# Patient Record
Sex: FEMALE | Race: OTHER | Employment: FULL TIME | URBAN - METROPOLITAN AREA
[De-identification: names, ages, dates, MRNs, and addresses within clinical notes are randomized per-mention and may not be internally consistent; named-entity substitution may affect disease eponyms.]

---

## 2017-06-15 ENCOUNTER — HOSPITAL ENCOUNTER (OUTPATIENT)
Age: 32
Discharge: HOME | End: 2017-06-15
Attending: EMERGENCY MEDICINE
Payer: COMMERCIAL

## 2017-06-15 ENCOUNTER — APPOINTMENT (OUTPATIENT)
Dept: GENERAL RADIOLOGY | Age: 32
End: 2017-06-15
Attending: EMERGENCY MEDICINE
Payer: COMMERCIAL

## 2017-06-15 VITALS
SYSTOLIC BLOOD PRESSURE: 108 MMHG | OXYGEN SATURATION: 98 % | DIASTOLIC BLOOD PRESSURE: 87 MMHG | BODY MASS INDEX: 22 KG/M2 | WEIGHT: 115 LBS | TEMPERATURE: 98 F | RESPIRATION RATE: 16 BRPM | HEART RATE: 88 BPM

## 2017-06-15 DIAGNOSIS — S93.402A MODERATE ANKLE SPRAIN, LEFT, INITIAL ENCOUNTER: Primary | ICD-10-CM

## 2017-06-15 PROCEDURE — 73630 X-RAY EXAM OF FOOT: CPT | Performed by: EMERGENCY MEDICINE

## 2017-06-15 PROCEDURE — 73610 X-RAY EXAM OF ANKLE: CPT | Performed by: EMERGENCY MEDICINE

## 2017-06-15 PROCEDURE — 99203 OFFICE O/P NEW LOW 30 MIN: CPT

## 2017-06-15 NOTE — ED PROVIDER NOTES
Patient presents with: Ankle Injury  Lower Extremity Injury (musculoskeletal)    HPI:     Gomez Alexander is a 27year old female who presents with chief complaint of ankle/foot injury.   About 2 weeks ago, pt foot fell asleep, she went to get up and inver Technologist)  Patient states 2 weeks ago she stood up from sitting at a table and rolled her left ankle/foot. Patient has pain to left lateral ankle and foot and top of foot over 2nd-5th metatarsals.       FINDINGS:  No fracture, dislocation or osseous abn

## 2018-07-28 ENCOUNTER — HOSPITAL ENCOUNTER (OUTPATIENT)
Age: 33
Discharge: HOME | End: 2018-07-28
Payer: COMMERCIAL

## 2018-07-28 VITALS
OXYGEN SATURATION: 100 % | HEART RATE: 69 BPM | DIASTOLIC BLOOD PRESSURE: 87 MMHG | RESPIRATION RATE: 16 BRPM | WEIGHT: 110 LBS | TEMPERATURE: 98 F | BODY MASS INDEX: 21 KG/M2 | SYSTOLIC BLOOD PRESSURE: 136 MMHG

## 2018-07-28 DIAGNOSIS — H10.32 ACUTE BACTERIAL CONJUNCTIVITIS OF LEFT EYE: Primary | ICD-10-CM

## 2018-07-28 PROCEDURE — 99214 OFFICE O/P EST MOD 30 MIN: CPT

## 2018-07-28 PROCEDURE — 99213 OFFICE O/P EST LOW 20 MIN: CPT

## 2018-07-28 RX ORDER — CIPROFLOXACIN HYDROCHLORIDE 3.5 MG/ML
1 SOLUTION/ DROPS TOPICAL EVERY 4 HOURS
Qty: 1 BOTTLE | Refills: 0 | Status: SHIPPED | OUTPATIENT
Start: 2018-07-28 | End: 2018-08-04

## 2018-07-28 NOTE — ED PROVIDER NOTES
Patient Seen in: [address] [facility]    History   Patient presents with:   Eye Visual Problem (opthalmic)    Stated Complaint: left eye red and swollen X 2 days    HPI  Patient is a 64-year-old female that presents with left eye redness patien appears well-developed and well-nourished. No distress. HENT:          Eyes: EOM and lids are normal. Pupils are equal, round, and reactive to light. Lids are everted and swept, no foreign bodies found.  Right eye exhibits no discharge, [place]ud[place] [id number]  [phone]    if symptoms persist or worsen        Medications Prescribed:  Discharge Medication List as of 7/28/2018  1:40 PM    START taking these medications    ciprofloxacin HCl 0.3 % Ophthalmic Solution  Place 1 drop into the left eye ev

## 2018-07-28 NOTE — ED INITIAL ASSESSMENT (HPI)
Patient c/o left eye redness that started Wednesday. Started to become painful on Thursday. Redness in entire eye. Patient denies injury. Pt does wear contacts, but they are not in now.

## 2020-02-01 ENCOUNTER — OFFICE VISIT (OUTPATIENT)
Dept: FAMILY MEDICINE CLINIC | Facility: CLINIC | Age: 35
End: 2020-02-01
Payer: COMMERCIAL

## 2020-02-01 VITALS
OXYGEN SATURATION: 97 % | HEIGHT: 61 IN | DIASTOLIC BLOOD PRESSURE: 62 MMHG | TEMPERATURE: 98 F | HEART RATE: 72 BPM | BODY MASS INDEX: 23.98 KG/M2 | RESPIRATION RATE: 20 BRPM | SYSTOLIC BLOOD PRESSURE: 90 MMHG | WEIGHT: 127 LBS

## 2020-02-01 DIAGNOSIS — Z71.6 ENCOUNTER FOR SMOKING CESSATION COUNSELING: ICD-10-CM

## 2020-02-01 DIAGNOSIS — Z00.00 ANNUAL PHYSICAL EXAM: Primary | ICD-10-CM

## 2020-02-01 DIAGNOSIS — Z12.4 CERVICAL CANCER SCREENING: ICD-10-CM

## 2020-02-01 PROCEDURE — 88175 CYTOPATH C/V AUTO FLUID REDO: CPT | Performed by: FAMILY MEDICINE

## 2020-02-01 PROCEDURE — 87624 HPV HI-RISK TYP POOLED RSLT: CPT | Performed by: FAMILY MEDICINE

## 2020-02-01 PROCEDURE — 99385 PREV VISIT NEW AGE 18-39: CPT | Performed by: FAMILY MEDICINE

## 2020-02-01 RX ORDER — VARENICLINE TARTRATE 0.5 (11)-1
KIT ORAL
COMMUNITY
Start: 2020-01-03 | End: 2021-08-18 | Stop reason: ALTCHOICE

## 2020-02-01 RX ORDER — VARENICLINE TARTRATE 1 MG/1
1 TABLET, FILM COATED ORAL 2 TIMES DAILY
Qty: 60 TABLET | Refills: 1 | Status: SHIPPED | OUTPATIENT
Start: 2020-02-01 | End: 2020-03-30

## 2020-02-01 NOTE — PATIENT INSTRUCTIONS
Birth Control Methods  Birth control methods are used to help prevent pregnancy. There are many different methods to choose from.  Talk to your healthcare provider about which method is right for you. Be sure to ask your provider about the effectiveness o A condom is a sheath that forms a thin barrier between the penis and the vagina. It helps prevent pregnancy by keeping sperm from entering the vagina.  When latex condoms are used, they have the added benefit of protecting against most STIs (sexually transm This is when the man pulls his penis out of the vagina just before ejaculation (“coming”). This lowers the amount of sperm entering the vagina.  Be aware that fluids released just before ejaculation often still contain some sperm, so this method is not as r A condom is a thin covering that fits over the penis. (The female condom fits inside the vagina.) A condom catches sperm that come out of the penis during sex.   Spermicide  Spermicide is a gel, foam, cream, tablet, or sponge (although the sponge has velma Emergency contraception can help prevent pregnancy after unprotected sex. Hormone pills (“morning after pills”) are available over the counter to anyone. A second type of EC, a copper IUD, needs to be inserted by a trained healthcare provider.  Either type · The copper IUD releases a small amount of copper into the uterus. The copper makes it harder for sperm to reach the egg. The device works for at least 10 years. · The progestin IUD releases a hormone called progestin.  It causes changes in the uterus to Birth control pills contain hormones that help prevent pregnancy. The pills are prescribed by your healthcare provider. There are many types of birth control pills available. If you have side effects from one type of pill, tell your healthcare provider.  He In these cases, discuss the risks with your healthcare provider. Date Last Reviewed: 3/1/2017  © 3621-7995 [employer]. [address] [facility] [place] [address] [place] [address]. All rights reserved.  This information is not intended as a substitute for prof · May cause side effects such as nausea, weight gain, headaches, breast tenderness, fatigue, or mood changes (these often go away within 3 months)  · May take up to a year for you to become fertile (able to get pregnant) after stopping injections  · May in · Control diabetes, or reduce your risk of getting this disease  · Improve your heart and lung function  · Reach and maintain a healthy weight  · Make your muscles stronger and more limber so you can stay active  · Prevent falls and fractures by slowing th Changing the way you eat can improve your health. It can lower your cholesterol and blood pressure, and help you stay at a healthy weight. Your diet doesn’t have to be bland and boring to be healthy.  Just watch your calories and follow these steps:    Step · Drink more water to match your fiber increase to help prevent constipation. Date Last Reviewed: 6/1/2017  © 7819-1666 [employer]. 1407 [facility] [place], [address] [place] [address]. All rights reserved.  This information is not intended as a sub

## 2020-02-01 NOTE — PROGRESS NOTES
HPI:   Samira Malcolm is a 37year old female who presents for a complete physical exam.   No concerns today. She reports she was smoking half a pack a day and started chantix starter pack. She has not used cig since last 1 wk.    No se with chantix noted Diet: regular      REVIEW OF SYSTEMS:   GENERAL: feels well otherwise  SKIN: denies any unusual skin lesions  EYES:denies blurred vision or double vision  HEENT: denies nasal congestion, sinus pain or ST  LUNGS: denies shortness of breath  or cough  CARDIO understanding of these issues and agrees to the plan. 2. Contraception discussion- Discuss all methods with SE. She will think about it and come in 1 month. AVS with all methods given.

## 2020-02-06 LAB
HPV I/H RISK 1 DNA SPEC QL NAA+PROBE: NEGATIVE
LAST PAP RESULT: NORMAL
PAP HISTORY (OTHER THAN LAST PAP): NORMAL

## 2020-02-10 ENCOUNTER — TELEPHONE (OUTPATIENT)
Dept: FAMILY MEDICINE CLINIC | Facility: CLINIC | Age: 35
End: 2020-02-10

## 2020-02-10 NOTE — TELEPHONE ENCOUNTER
Called patient inform pap was neg. It did showed BV. Patient denies any discharge no itching. If she has discharge/itching call office and we will send antibiotics. Wilbur understands.

## 2020-02-13 ENCOUNTER — TELEPHONE (OUTPATIENT)
Dept: FAMILY MEDICINE CLINIC | Facility: CLINIC | Age: 35
End: 2020-02-13

## 2020-02-13 LAB
ABSOLUTE BASOPHILS: 32 CELLS/UL (ref 0–200)
ABSOLUTE EOSINOPHILS: 99 CELLS/UL (ref 15–500)
ABSOLUTE LYMPHOCYTES: 1553 CELLS/UL (ref 850–3900)
ABSOLUTE MONOCYTES: 369 CELLS/UL (ref 200–950)
ABSOLUTE NEUTROPHILS: 2448 CELLS/UL (ref 1500–7800)
ALBUMIN/GLOBULIN RATIO: 1.8 (CALC) (ref 1–2.5)
ALBUMIN: 4.4 G/DL (ref 3.6–5.1)
ALKALINE PHOSPHATASE: 50 U/L (ref 31–125)
ALT: 11 U/L (ref 6–29)
AST: 11 U/L (ref 10–30)
BASOPHILS: 0.7 %
BILIRUBIN, TOTAL: 0.6 MG/DL (ref 0.2–1.2)
BUN: 16 MG/DL (ref 7–25)
CALCIUM: 9.5 MG/DL (ref 8.6–10.2)
CARBON DIOXIDE: 24 MMOL/L (ref 20–32)
CHLORIDE: 106 MMOL/L (ref 98–110)
CHOL/HDLC RATIO: 2.3 (CALC)
CHOLESTEROL, TOTAL: 138 MG/DL
CREATININE: 0.68 MG/DL (ref 0.5–1.1)
EGFR IF AFRICN AM: 130 ML/MIN/1.73M2
EGFR IF NONAFRICN AM: 113 ML/MIN/1.73M2
EOSINOPHILS: 2.2 %
GLOBULIN: 2.5 G/DL (CALC) (ref 1.9–3.7)
GLUCOSE: 87 MG/DL (ref 65–99)
HDL CHOLESTEROL: 60 MG/DL
HEMATOCRIT: 39.8 % (ref 35–45)
HEMOGLOBIN: 13.9 G/DL (ref 11.7–15.5)
LDL-CHOLESTEROL: 64 MG/DL (CALC)
LYMPHOCYTES: 34.5 %
MCH: 32.1 PG (ref 27–33)
MCHC: 34.9 G/DL (ref 32–36)
MCV: 91.9 FL (ref 80–100)
MONOCYTES: 8.2 %
MPV: 10.4 FL (ref 7.5–12.5)
NEUTROPHILS: 54.4 %
NON-HDL CHOLESTEROL: 78 MG/DL (CALC)
PLATELET COUNT: 187 THOUSAND/UL (ref 140–400)
POTASSIUM: 4.3 MMOL/L (ref 3.5–5.3)
PROTEIN, TOTAL: 6.9 G/DL (ref 6.1–8.1)
RDW: 11.8 % (ref 11–15)
RED BLOOD CELL COUNT: 4.33 MILLION/UL (ref 3.8–5.1)
SODIUM: 139 MMOL/L (ref 135–146)
TRIGLYCERIDES: 48 MG/DL
TSH W/REFLEX TO FT4: 1.31 MIU/L
WHITE BLOOD CELL COUNT: 4.5 THOUSAND/UL (ref 3.8–10.8)

## 2020-02-13 NOTE — TELEPHONE ENCOUNTER
----- Message from Jojo Husain MD sent at 2/13/2020  9:02 AM CST -----  Please call patient and inform that her labs looks normal.

## 2020-03-30 DIAGNOSIS — Z71.6 ENCOUNTER FOR SMOKING CESSATION COUNSELING: ICD-10-CM

## 2020-03-30 RX ORDER — VARENICLINE TARTRATE 1 MG/1
TABLET, FILM COATED ORAL
Qty: 60 TABLET | Refills: 1 | Status: SHIPPED | OUTPATIENT
Start: 2020-03-30 | End: 2021-08-18 | Stop reason: ALTCHOICE

## 2020-08-22 ENCOUNTER — HOSPITAL ENCOUNTER (OUTPATIENT)
Age: 35
Discharge: OTHER INSTITUTION | End: 2020-08-22
Payer: COMMERCIAL

## 2020-08-22 VITALS
HEART RATE: 78 BPM | DIASTOLIC BLOOD PRESSURE: 78 MMHG | RESPIRATION RATE: 20 BRPM | OXYGEN SATURATION: 100 % | SYSTOLIC BLOOD PRESSURE: 158 MMHG | TEMPERATURE: 98 F

## 2020-08-22 DIAGNOSIS — L03.211 FACIAL CELLULITIS: Primary | ICD-10-CM

## 2020-08-22 PROCEDURE — 99213 OFFICE O/P EST LOW 20 MIN: CPT | Performed by: PHYSICIAN ASSISTANT

## 2020-08-22 RX ORDER — DICLOXACILLIN SODIUM 250 MG/1
250 CAPSULE ORAL 4 TIMES DAILY
COMMUNITY
End: 2021-05-24

## 2020-08-22 NOTE — ED PROVIDER NOTES
Patient Seen in: [address] [facility]      History   Patient presents with:  Rash  Swelling    Stated Complaint: skin infection- poss allergic reaction x 2 days    HPI    Patient is a pleasant 78-year-old female.   She has had a facial lesion/w conjunctival  ENT: Erythematous ulcerating lash with a few scattered vesicles and some honey colored discharge noted to the face. Moderate left periocular erythema and swelling. Some involvement of the T-zone.   Lung: No distress, RR, no retraction  Extre

## 2020-08-31 ENCOUNTER — TELEPHONE (OUTPATIENT)
Dept: FAMILY MEDICINE CLINIC | Facility: CLINIC | Age: 35
End: 2020-08-31

## 2020-08-31 NOTE — TELEPHONE ENCOUNTER
I can see her. If she wants to see derm I recommend she see them in 1 to 3 days as I don’t want condition to worsen. If she can not get in with derm sooner call office and we can accommodate her.    Does she have a derm if not can you refer her to [facility]

## 2020-08-31 NOTE — TELEPHONE ENCOUNTER
Pt called [facility] stated she was in the hospital for 10 days for facial cellulitis and was put on antibiotics. Then she was in the ER yesterday and was given additional antibiotics.  She was wondering if she needs to f/u with Dr. Darron Wagoner or if she should get a

## 2020-08-31 NOTE — TELEPHONE ENCOUNTER
Pt does not have a Dermatologist.  I gave her the phone # for  and told her if they can not accommodate her in the next 1-3 days to CB to schedule with SC. Pt verbalized understanding.

## 2020-08-31 NOTE — TELEPHONE ENCOUNTER
Call from patient. Has hx of impetigo on face and spots got infected. Left eye was almost swollen shut. 1 week ago Saturday went to [facility] and was sent to ER. Was admitted-on IV abx for 1 week. Released 1 week ago.   Last Thursday started having more facial sw

## 2021-05-24 ENCOUNTER — OFFICE VISIT (OUTPATIENT)
Dept: FAMILY MEDICINE CLINIC | Facility: CLINIC | Age: 36
End: 2021-05-24
Payer: COMMERCIAL

## 2021-05-24 VITALS
OXYGEN SATURATION: 99 % | SYSTOLIC BLOOD PRESSURE: 98 MMHG | HEIGHT: 61 IN | DIASTOLIC BLOOD PRESSURE: 60 MMHG | BODY MASS INDEX: 24.55 KG/M2 | WEIGHT: 130 LBS | RESPIRATION RATE: 16 BRPM | HEART RATE: 77 BPM | TEMPERATURE: 99 F

## 2021-05-24 DIAGNOSIS — L03.211 FACIAL CELLULITIS: Primary | ICD-10-CM

## 2021-05-24 PROCEDURE — 99213 OFFICE O/P EST LOW 20 MIN: CPT | Performed by: NURSE PRACTITIONER

## 2021-05-24 PROCEDURE — 3074F SYST BP LT 130 MM HG: CPT | Performed by: NURSE PRACTITIONER

## 2021-05-24 PROCEDURE — 3008F BODY MASS INDEX DOCD: CPT | Performed by: NURSE PRACTITIONER

## 2021-05-24 PROCEDURE — 3078F DIAST BP <80 MM HG: CPT | Performed by: NURSE PRACTITIONER

## 2021-05-24 RX ORDER — CLINDAMYCIN HYDROCHLORIDE 300 MG/1
300 CAPSULE ORAL 3 TIMES DAILY
Qty: 21 CAPSULE | Refills: 0 | Status: SHIPPED | OUTPATIENT
Start: 2021-05-24 | End: 2021-05-31

## 2021-05-24 NOTE — PROGRESS NOTES
CHIEF COMPLAINT:   Patient presents with:  Rash      HPI:     Alee Blackwell is a 43year old female who presents with concerns of possible facial cellulitis. Patient first noticed symptoms this morning.   Reports erythema, increased warmth on her left MUSC/SKEL: no joint swelling, no joint stiffness. NEURO: no abnormal sensation, no tingling of the skin or numbness.     EXAM:   BP 98/60   Pulse 77   Temp 99.4 °F (37.4 °C) (Tympanic)   Resp 16   Ht 5' 1\" (1.549 m)   Wt 130 lb (59 kg)   LMP 05/03/2021 (E of the skin, it can be serious. If not treated, cellulitis can get into the bloodstream and lymph nodes. The infection can then spread throughout the body. This causes serious illness.  Cellulitis on the face is especially dangerous if it affects the skin a Formerly [facility]. All rights reserved. This information is not intended as a substitute for professional medical care. Always follow your healthcare professional's instructions.             The patient indicates understanding of these issues and agrees

## 2021-05-24 NOTE — PATIENT INSTRUCTIONS
Follow up with your doctor in 2 days. Go to the ER for any worsening symptoms such as spreading of redness, increased swelling, fever, or eye pain. Facial Cellulitis  Cellulitis is an infection of the deep layers of skin.  A break in the skin, such as Fever higher of 100.4º F (38.0º C) or higher after 2 days on antibiotics  · Red areas that spread  · Swelling or pain that gets worse  · Fluid leaking from the skin (pus)  · An eyelid that swells shut or leaks fluid (pus)  · Headache or neck pain that gets

## 2021-08-18 ENCOUNTER — OFFICE VISIT (OUTPATIENT)
Dept: FAMILY MEDICINE CLINIC | Facility: CLINIC | Age: 36
End: 2021-08-18
Payer: COMMERCIAL

## 2021-08-18 VITALS
SYSTOLIC BLOOD PRESSURE: 112 MMHG | DIASTOLIC BLOOD PRESSURE: 80 MMHG | TEMPERATURE: 98 F | RESPIRATION RATE: 16 BRPM | WEIGHT: 135 LBS | HEART RATE: 112 BPM | OXYGEN SATURATION: 99 % | HEIGHT: 61 IN | BODY MASS INDEX: 25.49 KG/M2

## 2021-08-18 DIAGNOSIS — U07.1 COVID: Primary | ICD-10-CM

## 2021-08-18 LAB
OPERATOR ID: ABNORMAL
POCT LOT NUMBER: ABNORMAL
RAPID SARS-COV-2 BY PCR: DETECTED

## 2021-08-18 PROCEDURE — U0002 COVID-19 LAB TEST NON-CDC: HCPCS | Performed by: PHYSICIAN ASSISTANT

## 2021-08-18 PROCEDURE — 99213 OFFICE O/P EST LOW 20 MIN: CPT | Performed by: PHYSICIAN ASSISTANT

## 2021-08-18 PROCEDURE — 3008F BODY MASS INDEX DOCD: CPT | Performed by: PHYSICIAN ASSISTANT

## 2021-08-18 PROCEDURE — 3074F SYST BP LT 130 MM HG: CPT | Performed by: PHYSICIAN ASSISTANT

## 2021-08-18 PROCEDURE — 3079F DIAST BP 80-89 MM HG: CPT | Performed by: PHYSICIAN ASSISTANT

## 2021-08-18 NOTE — PATIENT INSTRUCTIONS
Coronavirus Disease 2019 (COVID-19)     [employer] is committed to the safety and well-being of our patients, members, employees, and communities.  As concerns arise about the new strain of coronavirus that causes COVID-19, [employer] exposure  • After day 7 from date of last exposure with a negative test result (test must occur on day 5 or later)  After stopping quarantine, you should  • Watch for symptoms until 14 days after exposure.   • If you have symptoms, immediately self-isolate Care     If you are awaiting test results or are confirmed positive for COVID -19, and your symptoms worsen at home with symptoms such as: extreme weakness, difficult breathing, or unrelenting fevers greater than 100.4 degrees Fahrenheit, you should contac Follow-up  If you are diagnosed with COVID, refrain from exercise until approved by your primary care provider. Please call your primary care provider within 2 days of your discharge to arrange for a telehealth follow-up.  CDC does not recommend repeat test Control & Prevention (CDC)  10 things you can do to manage your health at home, Danielle.[email or web address]. pdf  [email or web address] Retrieved March 17, 2021, from [email or web address]/covid-19-information/covid-19-long-haulers. html  Long-term effects of covid-19. (n.d.).  Retrieved May 11, 2021, from [email or web address]

## 2021-08-18 NOTE — PROGRESS NOTES
CHIEF COMPLAINT:     Patient presents with:  Flu: Starting 8/17, congestion, sneezing, runny nose, headache, body aches with 101.4 fever.  - Entered by patient      HPI:   Lg Hooper is a 43year old female who presents with fever, congestion, bodyache bilaterally without rale, ronchi, wheeze. CARDIO: S1/S2 without murmur  GI: BS's present x4. No palpable masses or organomegaly. no tenderness on palpation. EXTREMITIES: no cyanosis, clubbing or edema  LYMPH:  No gross lymphadenopathy.       Recent [facility] emergency department for evaluation. Symptomatic care:   1. Rest. Drink plenty of fluids. 2. Tylenol or ibuprofen for discomfort or fever.    3. OTC decongestant (phenylephrine) expectorants (guaifenesin), nasal steroid sprays  (fluticasone) may be (touched, hugged, or kissed them)  * You shared eating or drinking utensils  * They sneezed, coughed, or somehow got respiratory droplets on you    Reducing the length of quarantine may make it easier for people to quarantine by reducing the time they ranjit the dispatch personnel that you have or may have COVID-19.   6. Cover your cough and sneezes.    7. Wash your hands often with soap and water for at least 20 seconds or clean your hands with an alcohol-based hand  that contains at least 60% alcohol days have passed for severe illness (requiring hospitalization) OR if you are immunocompromised.   If you have a fever with cough or shortness of breath but have not been exposed to someone with COVID-19 and have not tested positive for COVID-19, you should [email or web address]    Who is eligible to donate convalescent plasma?     Potential convalescent plasma donors must:    · Have had a confirmed diagnosis of COVID-19  · Be symptom-free for at least 14 days*    *Some peo for a Post-COVID condition? It is still too early to say for sure. Currently, we find the people who experience Post-COVID conditions to be random.   Researchers are trying to identify similarities between people with a Post-COVID condition to better unde

## 2021-09-16 ENCOUNTER — PATIENT MESSAGE (OUTPATIENT)
Dept: FAMILY MEDICINE CLINIC | Facility: CLINIC | Age: 36
End: 2021-09-16

## 2021-09-16 NOTE — TELEPHONE ENCOUNTER
From: Nick Arteaga  To: Breonna Good MD  Sent: 9/16/2021 12:39 PM CDT  Subject: Chantix    Hello,    I was prescribed Chantix back in early 2020. I was able to quit smoking from Jan through Sept, but unfortunately started back up in Oct 2020.  I'd lik

## 2021-09-22 NOTE — PROGRESS NOTES
No chief complaint on file. follow up onn smoking. This visit is conducted using Telemedicine with live, interactive video and audio.     Patient has been referred to the [facility] website at [email or web address] to review the yearly Consent to Treat docu cessation counseling  Discuss zyban she is ok to try. No seizure h/o in past. meds given. Follow up in 1month  Follow up for physical.   - buPROPion HCl ER, SR, 150 MG Oral Tablet 12 Hr; Take 1 tablet (150 mg total) by mouth 2 (two) times daily.  Start wi

## 2021-09-22 NOTE — PATIENT INSTRUCTIONS
Zyban 12 HR Extended Release Tablet 150 mg  Uses  This medicine is used for the following purposes:  · depression  · stop smoking  Instructions  Swallow the medicine without crushing or chewing it. This medicine may be taken with or without food.   Try t are pregnant, planning to be pregnant, or breastfeeding. Ask your pharmacist if this medicine can interact with any of your other medicines. Be sure to tell them about all the medicines you take.   Do not start or stop any other medicines without first spe beats  · dilation of the pupils  · seizures  · shortness of breath  A few people may have an allergic reactions to this medicine. Symptoms can include difficulty breathing, skin rash, itching, swelling, or severe dizziness.  If you notice any of these sympt

## 2021-11-04 ENCOUNTER — PATIENT MESSAGE (OUTPATIENT)
Dept: FAMILY MEDICINE CLINIC | Facility: CLINIC | Age: 36
End: 2021-11-04

## 2021-11-04 NOTE — TELEPHONE ENCOUNTER
From: Ivan Vogel  To: Tena Gomes MD  Sent: 11/4/2021 11:07 AM CDT  Subject: Andrade Donato,    I have been made aware by my employer that I will have to be vaccinated against [phone] in order to maintain my job.  So f

## 2021-11-05 ENCOUNTER — PATIENT MESSAGE (OUTPATIENT)
Dept: FAMILY MEDICINE CLINIC | Facility: CLINIC | Age: 36
End: 2021-11-05

## 2021-11-05 NOTE — TELEPHONE ENCOUNTER
From: Ezra Villarreal  Sent: 11/5/2021 10:25 AM CDT  To: Pablo Chance Clinical Staff  Subject: COVID Vaccine Concerns    Hi  [facility],    I have not received any COVID vaccine shots so far.  I've been working from home for the last 18-19 months now and didn

## 2022-01-07 ENCOUNTER — TELEMEDICINE (OUTPATIENT)
Dept: FAMILY MEDICINE CLINIC | Facility: CLINIC | Age: 37
End: 2022-01-07

## 2022-01-07 DIAGNOSIS — J06.9 VIRAL URI: Primary | ICD-10-CM

## 2022-01-07 PROCEDURE — 99213 OFFICE O/P EST LOW 20 MIN: CPT | Performed by: FAMILY MEDICINE

## 2022-01-07 RX ORDER — CODEINE PHOSPHATE AND GUAIFENESIN 10; 100 MG/5ML; MG/5ML
5 SOLUTION ORAL EVERY 6 HOURS PRN
Qty: 118 ML | Refills: 0 | Status: SHIPPED | OUTPATIENT
Start: 2022-01-07

## 2022-01-07 NOTE — PROGRESS NOTES
No chief complaint on file. Viral URI  This visit is conducted using Telemedicine with live, interactive video and audio. Patient has been referred to the [facility] website at [email or web address] to review the yearly Consent to Treat document.     Elmo distress. Neurological:      Mental Status: She is alert. Mental status is at baseline. ASSESSMENT/PLAN:   1. Viral URI  Supportive care.   OTC meds discuss  Warning sign including chest pain sob palpitation dizziness high grade fever no well

## 2023-01-04 ENCOUNTER — PATIENT MESSAGE (OUTPATIENT)
Dept: FAMILY MEDICINE CLINIC | Facility: CLINIC | Age: 38
End: 2023-01-04

## 2023-01-04 DIAGNOSIS — E55.9 VITAMIN D DEFICIENCY: Primary | ICD-10-CM

## 2023-10-26 ENCOUNTER — TELEPHONE (OUTPATIENT)
Dept: FAMILY MEDICINE CLINIC | Facility: CLINIC | Age: 38
End: 2023-10-26

## 2024-06-23 ENCOUNTER — PATIENT MESSAGE (OUTPATIENT)
Dept: FAMILY MEDICINE CLINIC | Facility: CLINIC | Age: 39
End: 2024-06-23

## 2024-06-24 NOTE — TELEPHONE ENCOUNTER
From: Sheila Alvarez  To: Mara Ruiz  Sent: 6/23/2024 9:23 AM CDT  Subject: Quit smoking    Hello,     I'd like to try to quit smoking. Last time I reached out, I tried wellbutrin because chantix was pulled off the market.     I'd tried chantix in the past and was successful for about nine months. Now that a generic is available, I'd like to try again.     Please let me know what I need to do. Thank you!

## 2025-04-04 ENCOUNTER — OFFICE VISIT (OUTPATIENT)
Dept: FAMILY MEDICINE CLINIC | Facility: CLINIC | Age: 40
End: 2025-04-04
Payer: COMMERCIAL

## 2025-04-04 VITALS
RESPIRATION RATE: 16 BRPM | WEIGHT: 135 LBS | TEMPERATURE: 97 F | OXYGEN SATURATION: 97 % | HEIGHT: 61.5 IN | DIASTOLIC BLOOD PRESSURE: 90 MMHG | HEART RATE: 76 BPM | BODY MASS INDEX: 25.16 KG/M2 | SYSTOLIC BLOOD PRESSURE: 140 MMHG

## 2025-04-04 DIAGNOSIS — R03.0 ELEVATED BLOOD PRESSURE READING: ICD-10-CM

## 2025-04-04 DIAGNOSIS — Z12.4 CERVICAL CANCER SCREENING: ICD-10-CM

## 2025-04-04 DIAGNOSIS — J30.2 SEASONAL ALLERGIES: ICD-10-CM

## 2025-04-04 DIAGNOSIS — Z12.31 BREAST CANCER SCREENING BY MAMMOGRAM: ICD-10-CM

## 2025-04-04 DIAGNOSIS — Z00.00 ANNUAL PHYSICAL EXAM: Primary | ICD-10-CM

## 2025-04-04 DIAGNOSIS — E55.9 VITAMIN D DEFICIENCY: ICD-10-CM

## 2025-04-04 PROBLEM — L03.211 FACIAL CELLULITIS: Status: ACTIVE | Noted: 2025-04-04

## 2025-04-04 PROBLEM — L03.211 FACIAL CELLULITIS: Status: RESOLVED | Noted: 2025-04-04 | Resolved: 2025-04-04

## 2025-04-04 PROCEDURE — 87624 HPV HI-RISK TYP POOLED RSLT: CPT

## 2025-04-04 PROCEDURE — 88175 CYTOPATH C/V AUTO FLUID REDO: CPT

## 2025-04-04 NOTE — PATIENT INSTRUCTIONS
Monitor blood pressure at home, keep readings for next visit  Monitor salt intake, caffeine and manage stress  Return to clinic with blood pressure readings in 2 weeks

## 2025-04-04 NOTE — PROGRESS NOTES
Chief Complaint   Patient presents with    Physical     No concerns          HPI  Sheila Alvarez is a 40 year old female here for new patient visit and physical.    Last colon cancer screen:  n/a    Last mammo: -due    Last Pap: -due    Acute concerns:   She reports quitting smoking in  and is attempting to increase her exercise regimen. The patient has never had a mammogram and admits to inconsistent medical follow-ups.    Patient describes experiencing seasonal allergies that began in December and have persisted, affecting her breathing, exercise capacity, and sleep. She is currently using nasal sprays for symptom management and is scheduled for allergy testing in mid-April.    Patient mentions having food sensitivities, specifically noting increased gas with consumption of egg whites and onions. She denies any other gastrointestinal symptoms, including diarrhea, constipation, or acid reflux.    The patient's blood pressure was noted to be elevated during the visit. She reports a similar finding at a recent ENT appointment but denies any prior diagnosis of hypertension.    Discussed:  - diet: regular diet  - exercise: pilates daily, hiking   - sleep: adequate  - stress: no concerns  - gyne: LMP:3/17, monthly, light bleeding, last 4 days  - vision: needs follow up  - dentist visit: UTD  - STD screening: UTD    ROS  As per HPI      Depression Screening (PHQ-2/PHQ-9): Over the LAST 2 WEEKS   Little interest or pleasure in doing things: Not at all    Feeling down, depressed, or hopeless: Not at all    PHQ-2 SCORE: 0          Past Medical History:    Facial cellulitis    Tobacco abuse       Past Surgical History:   Procedure Laterality Date          Tonsillectomy         Social History     Socioeconomic History    Marital status:    Tobacco Use    Smoking status: Former     Current packs/day: 0.00     Average packs/day: 0.5 packs/day for 10.0 years (5.0 ttl pk-yrs)     Types: Cigars, Cigarettes      Start date: 2010     Quit date: 2020     Years since quittin.1    Smokeless tobacco: Never   Vaping Use    Vaping status: Never Used   Substance and Sexual Activity    Alcohol use: Yes     Comment: social     Drug use: Yes     Types: Cannabis     Comment: occasionally    Sexual activity: Yes     Partners: Male     Birth control/protection: Condom       Family History   Adopted: Yes   Family history unknown: Yes        Medications Ordered Prior to Encounter[1]    Immunization History   Administered Date(s) Administered    Influenza 10/13/2004    MMR 1993    TDAP 2020            Objective  Vitals:    25 1310 25 1355   BP: (!) 140/92 140/90   Pulse: 76    Resp: 16    Temp: 97.3 °F (36.3 °C)    SpO2: 97%    Weight: 135 lb (61.2 kg)    Height: 5' 1.5\" (1.562 m)      Body mass index is 25.1 kg/m².    Physical Exam  Constitutional:       Appearance: Normal appearance.   HEENT:      Head: Normocephalic and atraumatic.      Eyes: PERRLA no notable nystagmus     Ears: normal on observation     Nose: Nose normal.      Mouth: Mucous membranes are moist.      Neck: no lymphadenopathy  Cardiovascular:      Rate and Rhythm: Normal rate and regular rhythm.   Pulmonary:      Effort: Pulmonary effort is normal.      Breath sounds: Normal breath sounds.   Abdominal:      General: Bowel sounds are normal.      Palpations: Abdomen is soft. There is no mass.   FEMALE :    EXTERNAL GENITALIA: normal, no lesions, no rash    VAGINA: moist mucosa, no discharge    CERVIX: no discharge, no cervical motion tenderness, +friable cervix, sample taken for pap smear    UTERUS: midline, mooth, normal size, non-tender    ADNEXAE: size wnl, no adnexal mass, no adnexal tenderness  Musculoskeletal:         General: Normal range of motion.    Skin:     General: Skin is warm and dry.   Neurological:      General: No focal deficit present.      Mental Status: Alert and oriented to person, place, and time.    Psychiatric:         Mood and Affect: Mood normal.         Thought Content: Thought content normal.       Assessment and Plan  Sheila was seen today for physical.    Diagnoses and all orders for this visit:    Annual physical exam  -     TSH W Reflex To Free T4  -     Lipid Panel  -     Comp Metabolic Panel (14)  -     CBC With Differential With Platelet  -     Hemoglobin A1C [E]    Elevated blood pressure reading  Comments:  Monitor blood pressure at home, low salt and caffeine intake  Return with blood pressure readings in 2 weeks    Seasonal allergies  Comments:  Stable with nasal spray    Vitamin D deficiency  -     VITAMIN D, 25-HYDROXY [65396][Q]    Breast cancer screening by mammogram  -     [facility] CHELLE 2D+3D SCREENING BILAT (CPT=77067/61801); Future    Cervical cancer screening  -     ThinPrep PAP Smear; Future  -     Hpv Dna  High Risk , Thin Prep Collect; Future         Patient presents for annual exam  - General labs: ordered, awaiting results  - Discussed signing up for patient portal as means of communicating with me directly  - Discussed importance of communicating with me if has not heard back with results, etc  - Discussed age-appropriate vaccinations and screenings  - Pt verbalizes understanding, all questions/concerns addressed, in agreement w/plan    Follow up  Return in about 2 weeks (around 4/18/2025) for blood pressure follow up.      Patient Instructions  Patient Instructions   Monitor blood pressure at home, keep readings for next visit  Monitor salt intake, caffeine and manage stress  Return to clinic with blood pressure readings in 2 weeks       Dahlia Briceno MD          [1]   Current Outpatient Medications on File Prior to Visit   Medication Sig Dispense Refill    Oxymetazoline HCl (NASAL SPRAY NA) by Nasal route.       No current facility-administered medications on file prior to visit.

## 2025-04-07 LAB — HPV E6+E7 MRNA CVX QL NAA+PROBE: NEGATIVE

## 2025-04-08 LAB
ABSOLUTE BASOPHILS: 21 CELLS/UL (ref 0–200)
ABSOLUTE EOSINOPHILS: 140 CELLS/UL (ref 15–500)
ABSOLUTE LYMPHOCYTES: 1285 CELLS/UL (ref 850–3900)
ABSOLUTE MONOCYTES: 231 CELLS/UL (ref 200–950)
ABSOLUTE NEUTROPHILS: 1824 CELLS/UL (ref 1500–7800)
ALBUMIN/GLOBULIN RATIO: 1.8 (CALC) (ref 1–2.5)
ALBUMIN: 4.4 G/DL (ref 3.6–5.1)
ALKALINE PHOSPHATASE: 50 U/L (ref 31–125)
ALT: 8 U/L (ref 6–29)
AST: 13 U/L (ref 10–30)
BASOPHILS: 0.6 %
BILIRUBIN, TOTAL: 0.7 MG/DL (ref 0.2–1.2)
BUN: 11 MG/DL (ref 7–25)
CALCIUM: 9.5 MG/DL (ref 8.6–10.2)
CARBON DIOXIDE: 25 MMOL/L (ref 20–32)
CHLORIDE: 106 MMOL/L (ref 98–110)
CHOL/HDLC RATIO: 2.6 (CALC)
CHOLESTEROL, TOTAL: 166 MG/DL
CREATININE: 0.69 MG/DL (ref 0.5–0.99)
EGFR: 111 ML/MIN/1.73M2
EOSINOPHILS: 4 %
GLOBULIN: 2.4 G/DL (CALC) (ref 1.9–3.7)
GLUCOSE: 102 MG/DL (ref 65–99)
HDL CHOLESTEROL: 64 MG/DL
HEMATOCRIT: 41.6 % (ref 35–45)
HEMOGLOBIN A1C: 5.2 % OF TOTAL HGB
HEMOGLOBIN: 13.1 G/DL (ref 11.7–15.5)
LDL-CHOLESTEROL: 81 MG/DL (CALC)
LYMPHOCYTES: 36.7 %
MCH: 29.5 PG (ref 27–33)
MCHC: 31.5 G/DL (ref 32–36)
MCV: 93.7 FL (ref 80–100)
MONOCYTES: 6.6 %
MPV: 10.6 FL (ref 7.5–12.5)
NEUTROPHILS: 52.1 %
NON-HDL CHOLESTEROL: 102 MG/DL (CALC)
PLATELET COUNT: 208 THOUSAND/UL (ref 140–400)
POTASSIUM: 4.2 MMOL/L (ref 3.5–5.3)
PROTEIN, TOTAL: 6.8 G/DL (ref 6.1–8.1)
RDW: 12.7 % (ref 11–15)
RED BLOOD CELL COUNT: 4.44 MILLION/UL (ref 3.8–5.1)
SODIUM: 138 MMOL/L (ref 135–146)
TRIGLYCERIDES: 112 MG/DL
TSH W/REFLEX TO FT4: 1.65 MIU/L
VITAMIN D, 25-OH, TOTAL: 22 NG/ML (ref 30–100)
WHITE BLOOD CELL COUNT: 3.5 THOUSAND/UL (ref 3.8–10.8)

## 2025-04-18 ENCOUNTER — TELEPHONE (OUTPATIENT)
Dept: FAMILY MEDICINE CLINIC | Facility: CLINIC | Age: 40
End: 2025-04-18

## 2025-04-18 RX ORDER — ERGOCALCIFEROL 1.25 MG/1
50000 CAPSULE, LIQUID FILLED ORAL WEEKLY
Qty: 4 CAPSULE | Refills: 1 | Status: SHIPPED | OUTPATIENT
Start: 2025-04-18 | End: 2025-06-07

## 2025-04-18 NOTE — TELEPHONE ENCOUNTER
----- Message from Dahlia Briceno sent at 4/18/2025 10:28 AM CDT -----  CBC shows no anemia  CMP shows mildly elevated blood glucose, normal  kidney function and liver enzymes. Recommend monitoring carbs/sugar intake  Hemoglobin A1C shows no diabetes or pre-diabetes  Thyroid function is normal  Lipid panel shows normal cholesterol levels  Vitamin D level is low, recommend weekly vitamin D supplements then otc 2000iu daily supplements after  Pap smear/HPV are all negative, repeat in 5 years    ----- Message -----  From: Lab, Background User  Sent: 4/7/2025   2:02 PM CDT  To: Dahlia Briceno MD

## 2025-04-21 ENCOUNTER — OFFICE VISIT (OUTPATIENT)
Dept: FAMILY MEDICINE CLINIC | Facility: CLINIC | Age: 40
End: 2025-04-21
Payer: COMMERCIAL

## 2025-04-21 ENCOUNTER — HOSPITAL ENCOUNTER (OUTPATIENT)
Dept: MAMMOGRAPHY | Age: 40
Discharge: HOME | End: 2025-04-21
Payer: COMMERCIAL

## 2025-04-21 VITALS
SYSTOLIC BLOOD PRESSURE: 126 MMHG | RESPIRATION RATE: 16 BRPM | TEMPERATURE: 97 F | HEART RATE: 87 BPM | HEIGHT: 61.5 IN | WEIGHT: 137 LBS | DIASTOLIC BLOOD PRESSURE: 78 MMHG | BODY MASS INDEX: 25.53 KG/M2 | OXYGEN SATURATION: 97 %

## 2025-04-21 DIAGNOSIS — Z12.31 BREAST CANCER SCREENING BY MAMMOGRAM: ICD-10-CM

## 2025-04-21 DIAGNOSIS — R03.0 ELEVATED BLOOD PRESSURE READING: Primary | ICD-10-CM

## 2025-04-21 PROCEDURE — 3078F DIAST BP <80 MM HG: CPT

## 2025-04-21 PROCEDURE — 99213 OFFICE O/P EST LOW 20 MIN: CPT

## 2025-04-21 PROCEDURE — 3008F BODY MASS INDEX DOCD: CPT

## 2025-04-21 PROCEDURE — 3074F SYST BP LT 130 MM HG: CPT

## 2025-04-21 PROCEDURE — 77063 BREAST TOMOSYNTHESIS BI: CPT

## 2025-04-21 PROCEDURE — 77067 SCR MAMMO BI INCL CAD: CPT

## 2025-04-21 NOTE — PATIENT INSTRUCTIONS
Continue to monitor blood pressure at home  Monitor salt intake, caffeine intake and manage stress   Return to clinic if consistently elevated

## (undated) NOTE — ED AVS SNAPSHOT
[facility] in [address] [address]    Phone:  [phone]    Fax:  [phone]           Mrs. Oscar Garcia   MRN: [id number]    Department:  [facility]   Date of Visit:  6/15/2017 Expect to receive an electronic request (by e-mail or text) to complete a self-assessment the day after your visit. You may also receive a call from our patient liason soon after your visit.  Also, some patients receive a detailed feedback survey mailed to [facility] [address]  ([address]) [address] [facility] [phone] [address] ([address] [place] [address]) [phone] [address]  [address] ( (CPT=[id number]), 6/15/2017, 10:22. INDICATIONS:  ankle/foot pain     PATIENT STATED HISTORY: (As transcribed by Technologist)  Patient states 2 weeks ago she stood up from sitting at a table and rolled her left ankle/foot.  Patient has pain to left lateral Support Staff. Remember, MyChart is NOT to be used for urgent needs. For medical emergencies, dial 911.